# Patient Record
Sex: FEMALE | Race: WHITE
[De-identification: names, ages, dates, MRNs, and addresses within clinical notes are randomized per-mention and may not be internally consistent; named-entity substitution may affect disease eponyms.]

---

## 2020-01-12 ENCOUNTER — HOSPITAL ENCOUNTER (EMERGENCY)
Dept: HOSPITAL 61 - ER | Age: 20
LOS: 1 days | Discharge: HOME | End: 2020-01-13
Payer: COMMERCIAL

## 2020-01-12 VITALS — SYSTOLIC BLOOD PRESSURE: 113 MMHG | DIASTOLIC BLOOD PRESSURE: 74 MMHG

## 2020-01-12 DIAGNOSIS — H61.21: ICD-10-CM

## 2020-01-12 DIAGNOSIS — J45.909: ICD-10-CM

## 2020-01-12 DIAGNOSIS — J03.90: Primary | ICD-10-CM

## 2020-01-12 DIAGNOSIS — Z88.0: ICD-10-CM

## 2020-01-12 DIAGNOSIS — I10: ICD-10-CM

## 2020-01-12 PROCEDURE — 96372 THER/PROPH/DIAG INJ SC/IM: CPT

## 2020-01-12 PROCEDURE — 99284 EMERGENCY DEPT VISIT MOD MDM: CPT

## 2020-01-13 NOTE — PHYS DOC
Past Medical History


Past Medical History:  Asthma, Hypertension


Past Surgical History:  No Surgical History


Alcohol Use:  None


Drug Use:  None





Adult General


Chief Complaint


Chief Complaint:  EARACHE/EAR PAIN





HPI


HPI





Patient is a 19  year old female who presents to the ED today complaining of a 

sore throat that began 3 days ago and right ear pain that began today. Patient 

denies any fever coughing or congestion. She reports she was seen at urgent care

yesterday and they did nothing for her though  mother states patient was given 

azithromycin which they believe is not working.





Review of Systems


Review of Systems





Constitutional: Denies fever or chills []


Eyes: Denies change in visual acuity, redness, or eye pain []


HENT: Reports sore throat and right ear pain. Denies nasal congestion 


Respiratory: Denies cough or shortness of breath []


Cardiovascular: No additional information not addressed in HPI []


GI: Denies abdominal pain, nausea, vomiting, bloody stools or diarrhea []


: Denies dysuria or hematuria []


Musculoskeletal: Denies back pain or joint pain []


Integument: Denies rash or skin lesions []


Neurologic: Denies headache, focal weakness or sensory changes []








All other systems were reviewed and found to be within normal limits, except as 

documented in this note.





Current Medications


Current Medications





Current Medications








 Medications


  (Trade)  Dose


 Ordered  Sig/Gali  Start Time


 Stop Time Status Last Admin


Dose Admin


 


 Acetaminophen


  (Tylenol)  1,000 mg  1X  ONCE  1/13/20 00:30


 1/13/20 00:31 DC 1/13/20 00:32


1,000 MG


 


 Dexamethasone


 Sodium Phosphate


  (Decadron)  10 mg  1X  ONCE  1/13/20 00:30


 1/13/20 00:31 DC 1/13/20 00:33


10 MG


 


 Diphenhydramine


 HCl


  (Benadryl)  25 mg  1X  ONCE  1/13/20 00:30


 1/13/20 00:31 DC 1/13/20 00:32


25 MG


 


 Penicillin G


 Benzathine


  (Bicillin L-A)  1,200,000


 unit  1X  ONCE  1/13/20 00:30


 1/13/20 00:31 DC 1/13/20 00:34


1,200,000 UNIT











Allergies


Allergies





Allergies








Coded Allergies Type Severity Reaction Last Updated Verified


 


  Penicillins Allergy Mild CHILDHOOD REACTION 1/13/20 Yes











Physical Exam


Physical Exam





Constitutional: Well developed, well nourished, no acute distress, non-toxic 

appearance. []


HENT: Normocephalic, atraumatic, bilateral external ears normal, oropharynx 

moist, no oral exudates, nose normal. []


Right TM with mild amount of cerumen. TM can't be visualized but does not appear

 erythematous.


+2 tonsils with mild erythema and trace amount exudate bilaterally


+2 anterior cervical adenopathy


Eyes: PERRLA, EOMI, conjunctiva normal, no discharge. [] 


Neck: Normal range of motion, no tenderness, supple, no stridor. [] 


Cardiovascular:Heart rate regular rhythm, no murmur []


Lungs & Thorax:  Bilateral breath sounds clear to auscultation []


Abdomen: Bowel sounds normal, soft, no tenderness, no masses, no pulsatile 

masses. [] 


Skin: Warm, dry, no erythema, no rash. [] 


Back: No tenderness, no CVA tenderness. [] 


Extremities: No tenderness, no cyanosis, no clubbing, ROM intact, no edema. [] 


Neurologic: Alert and oriented X 3, normal motor function, normal sensory 

function, no focal deficits noted. []


Psychologic: Affect normal, judgement normal, mood normal. []





Current Patient Data


Vital Signs





                                   Vital Signs








  Date Time  Temp Pulse Resp B/P (MAP) Pulse Ox O2 Delivery O2 Flow Rate FiO2


 


1/12/20 23:35 98.3 90 18 113/74 (87) 96 Room Air  





 98.3       











EKG


EKG


[]





Radiology/Procedures


Radiology/Procedures


[]





Course & Med Decision Making


Course & Med Decision Making


Pertinent Labs and Imaging studies reviewed. (See chart for details)





This is a 19-year-old female patient presenting with acute tonsillitis and right

 otalgia. Was given azithromycin yesterday at urgent care which she believes is 

not working though she is only taken one time dose. We gave her penicillin IM in

 the ED and prednisone. Discharged to home. OTC Debrox for cerumen removal.





Dragon Disclaimer


Dragon Disclaimer


This electronic medical record was generated, in whole or in part, using a voice

 recognition dictation system.





Departure


Departure


Impression:  


   Primary Impression:  


   Acute tonsillitis


   Additional Impressions:  


   Otalgia of right ear


   Cerumen impaction


Disposition:  01 HOME, SELF-CARE


Condition:  STABLE


Referrals:  


SOLTYS,JERAMY L DO (PCP)


follow up with your doctor next week


Patient Instructions:  Cerumen Impaction, Otalgia-Brief, Tonsillitis





Additional Instructions:  


You were given antibiotics injection in the emergency room


Stop taking the Z-pack you got from urgent care


You can take salt water gurgles for sore throat.


Please use over the counter debrox for ear wax removal


You can take Tylenol or motrin for fever or pain





Problem Qualifiers








   Primary Impression:  


   Acute tonsillitis


   Pharyngitis/tonsillitis etiology:  unspecified etiology  Qualified Codes:  

   J03.90 - Acute tonsillitis, unspecified


   Additional Impressions:  


   Cerumen impaction


   Laterality:  right  Qualified Codes:  H61.21 - Impacted cerumen, right ear








CIARRA MCKENZIE              Jan 13, 2020 00:41